# Patient Record
Sex: MALE | Race: WHITE | NOT HISPANIC OR LATINO | URBAN - METROPOLITAN AREA
[De-identification: names, ages, dates, MRNs, and addresses within clinical notes are randomized per-mention and may not be internally consistent; named-entity substitution may affect disease eponyms.]

---
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
## 2019-09-28 ENCOUNTER — EMERGENCY (EMERGENCY)
Facility: HOSPITAL | Age: 19
LOS: 0 days | Discharge: HOME | End: 2019-09-28
Attending: EMERGENCY MEDICINE
Payer: MEDICAID

## 2019-09-28 VITALS
OXYGEN SATURATION: 98 % | RESPIRATION RATE: 16 BRPM | DIASTOLIC BLOOD PRESSURE: 75 MMHG | SYSTOLIC BLOOD PRESSURE: 143 MMHG | TEMPERATURE: 99 F

## 2019-09-28 VITALS — WEIGHT: 179.9 LBS | HEIGHT: 71 IN
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
## 2019-09-28 DIAGNOSIS — S51.812A LACERATION WITHOUT FOREIGN BODY OF LEFT FOREARM, INITIAL ENCOUNTER: ICD-10-CM
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
## 2019-09-28 DIAGNOSIS — S59.802A OTHER SPECIFIED INJURIES OF LEFT ELBOW, INITIAL ENCOUNTER: ICD-10-CM

## 2019-09-28 PROCEDURE — 12032 INTMD RPR S/A/T/EXT 2.6-7.5: CPT

## 2019-09-28 PROCEDURE — 99285 EMERGENCY DEPT VISIT HI MDM: CPT | Mod: 25

## 2019-09-28 PROCEDURE — 73090 X-RAY EXAM OF FOREARM: CPT | Mod: 26,LT

## 2019-09-28 PROCEDURE — 73090 X-RAY EXAM OF FOREARM: CPT | Mod: LT

## 2019-09-28 PROCEDURE — 99285 EMERGENCY DEPT VISIT HI MDM: CPT

## 2019-09-28 RX ORDER — CEPHALEXIN 500 MG
500 CAPSULE ORAL ONCE
Refills: 0 | Status: COMPLETED | OUTPATIENT
Start: 2019-09-28 | End: 2019-09-28

## 2019-09-28 RX ORDER — CEPHALEXIN 500 MG
1 CAPSULE ORAL
Qty: 28 | Refills: 0
Start: 2019-09-28 | End: 2019-10-04

## 2019-09-28 RX ADMIN — Medication 500 MILLIGRAM(S): at 20:25

## 2019-09-28 NOTE — CONSULT NOTE ADULT - ASSESSMENT
A/P: 18M with L forearm lacerations    Keflex TID x 7 days  Dressings clean and dry, change in 3 days with gauze and re-wrap with ace  WBAT LUE  Ice, elevate, extremity as needed  Encourage ROM exercises  FU Dr. Vázquez in clinic in 7-10 days, call office for appointment  Discussed with Dr. Vázquez who agrees with plan    Leo Miranda, DO PGY1  Orthopaedic Surgery
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
## 2019-09-28 NOTE — ED STATDOCS - PATIENT PORTAL LINK FT
You can access the FollowMyHealth Patient Portal offered by [facility] by registering at the following website: [email or web address]. By joining [employer]’s FollowMyHealth portal, you will also be able to view your health information using other applications (apps) compatible with our system.
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
## 2019-09-28 NOTE — ED STATDOCS - MUSCULOSKELETAL, MLM
Gross motor/sensation intact LUE. muscles visible through large laceration of forearm. 2+ radial pulse.
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
## 2019-09-29 RX ORDER — CEPHALEXIN 500 MG
1 CAPSULE ORAL
Qty: 28 | Refills: 0
Start: 2019-09-29 | End: 2019-10-05

## 2019-10-01 ENCOUNTER — EMERGENCY (EMERGENCY)
Facility: HOSPITAL | Age: 19
LOS: 0 days | Discharge: HOME | End: 2019-10-01
Attending: EMERGENCY MEDICINE
Payer: MEDICAID

## 2019-10-01 VITALS
DIASTOLIC BLOOD PRESSURE: 73 MMHG | RESPIRATION RATE: 18 BRPM | OXYGEN SATURATION: 100 % | HEART RATE: 60 BPM | TEMPERATURE: 98 F | SYSTOLIC BLOOD PRESSURE: 135 MMHG

## 2019-10-01 VITALS — HEIGHT: 71 IN | WEIGHT: 179.9 LBS

## 2019-10-01 DIAGNOSIS — S51.822D: ICD-10-CM
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
## 2019-10-01 PROCEDURE — 99282 EMERGENCY DEPT VISIT SF MDM: CPT
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
## 2019-10-01 NOTE — ED PROVIDER NOTE - OBJECTIVE STATEMENT
19 y/o male with no pertinent PMHx presents to the ED c/o laceration to left arm from Saturday. Pt came here and had x-ray and received abx and was discharged to f/u with Dr. Vora but pt came here instead. NKDA. No fever
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
## 2019-10-01 NOTE — ED PROVIDER NOTE - NSFOLLOWUPINSTRUCTIONS_ED_ALL_ED_FT
Laceration    WHAT YOU NEED TO KNOW:    A laceration is an injury to the skin and the soft tissue underneath it. Lacerations happen when you are cut or hit by something. They can happen anywhere on the body.     DISCHARGE INSTRUCTIONS:    Return to the emergency department if:     You have heavy bleeding or bleeding that does not stop after 10 minutes of holding firm, direct pressure over the wound.       Your wound opens up.     Contact your healthcare provider if:     You have a fever or chills.       Your laceration is red, warm, or swollen.      You have red streaks on your skin coming from your wound.      You have white or yellow drainage from the wound that smells bad.      You have pain that gets worse, even after treatment.       You have questions or concerns about your condition or care.     Medicines:     Prescription pain medicine may be given. Ask how to take this medicine safely.       Antibiotics help treat or prevent a bacterial infection.       Take your medicine as directed. Contact your healthcare provider if you think your medicine is not helping or if you have side effects. Tell him or her if you are allergic to any medicine. Keep a list of the medicines, vitamins, and herbs you take. Include the amounts, and when and why you take them. Bring the list or the pill bottles to follow-up visits. Carry your medicine list with you in case of an emergency.    Care for your wound as directed:     Do not get your wound wet until your healthcare provider says it is okay. Do not soak your wound in water. Do not go swimming until your healthcare provider says it is okay. Carefully wash the wound with soap and water. Gently pat the area dry or allow it to air dry.       Change your bandages when they get wet, dirty, or after washing. Apply new, clean bandages as directed. Do not apply elastic bandages or tape too tight. Do not put powders or lotions over your incision.       Apply antibiotic ointment as directed. Your healthcare provider may give you antibiotic ointment to put over your wound if you have stitches. If you have strips of tape over your incision, let them dry up and fall off on their own. If they do not fall off within 14 days, gently remove them. If you have glue over your wound, do not remove or pick at it. If your glue comes off, do not replace it with glue that you have at home.       Check your wound every day for signs of infection such as swelling, redness, or pus.     Self-care:     Apply ice on your wound for 15 to 20 minutes every hour or as directed. Use an ice pack, or put crushed ice in a plastic bag. Cover it with a towel. Ice helps prevent tissue damage and decreases swelling and pain.      Use a splint as directed. A splint will decrease movement and stress on your wound. It may help it heal faster. A splint may be used for lacerations over joints or areas of your body that bend. Ask your healthcare provider how to apply and remove a splint.       Decrease scarring of your wound by applying ointments as directed. Do not apply ointments until your healthcare provider says it is okay. You may need to wait until your wound is healed. Ask which ointment to buy and how often to use it. After your wound is healed, use sunscreen over the area when you are out in the sun. You should do this for at least 6 months to 1 year after your injury.     Follow up with your healthcare provider as directed: You may need to follow up in 24 to 48 hours to have your wound checked for infection. You will need to return in 3 to 14 days if you have stitches or staples so they can be removed. Care for your wound as directed to prevent infection and help it heal. Write down your questions so you remember to ask them during your visits.     FOLLOW UP WITH [facility] IN 7-10 DAYS FOR SUTURE REMOVAL. FINISH YOUR ANTIBIOTICS. RETURN TO ER FOR ANY WORSENING SYMPTOMS OR NEW CONCERNS.

## 2019-10-01 NOTE — ED PROVIDER NOTE - PATIENT PORTAL LINK FT
You can access the FollowMyHealth Patient Portal offered by [facility] by registering at the following website: [email or web address]. By joining [employer]’s FollowMyHealth portal, you will also be able to view your health information using other applications (apps) compatible with our system.

## 2019-10-01 NOTE — ED PROVIDER NOTE - NSFOLLOWUPCLINICS_GEN_ALL_ED_FT
[facility], later [facility]  Family Medicine  [address] [place] [address]  Phone: [phone]  Fax:   Follow Up Time: 7-10 Days

## 2019-10-01 NOTE — ED PROVIDER NOTE - PROGRESS NOTE DETAILS
signed Michela German PA-C   Pt was unable to f/u with Dr Jackson as outpt due to insurance. saw Hansa kang social work here, pt will f/u at [facility] for suture removal 10-12 days. return precautions given. continue abx. Wound well appearing, sutures in place, no erythema, purulence, or dehisence, + swelling and yellowish contusion of forearm. Gross motor/sensation intact. return precautions given. Pt feeling well at AL, agrees with DC and plan of care.

## 2019-10-01 NOTE — ED PROVIDER NOTE - ATTENDING CONTRIBUTION TO CARE
I, Jerson Garcia MD, personally saw the patient with ACP. I have personally performed a face to face diagnostic evaluation on this patient. I reviewed the ACP note and agree with the history, exam, and plan of care, except as noted.
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
## 2019-10-01 NOTE — ED PROVIDER NOTE - SKIN, MLM
+mild swelling to left arm with stiches intact, good neuro motor function, no signs of infection, tendons ok

## 2020-03-03 ENCOUNTER — TRANSCRIPTION ENCOUNTER (OUTPATIENT)
Age: 20
End: 2020-03-03

## 2020-06-11 ENCOUNTER — TRANSCRIPTION ENCOUNTER (OUTPATIENT)
Age: 20
End: 2020-06-11

## 2020-06-18 ENCOUNTER — TRANSCRIPTION ENCOUNTER (OUTPATIENT)
Age: 20
End: 2020-06-18

## 2021-05-27 ENCOUNTER — EMERGENCY (EMERGENCY)
Facility: HOSPITAL | Age: 21
LOS: 0 days | Discharge: HOME | End: 2021-05-27
Attending: EMERGENCY MEDICINE
Payer: MEDICAID

## 2021-05-27 VITALS
HEART RATE: 55 BPM | RESPIRATION RATE: 16 BRPM | DIASTOLIC BLOOD PRESSURE: 79 MMHG | HEIGHT: 71 IN | WEIGHT: 160.06 LBS | TEMPERATURE: 98 F | SYSTOLIC BLOOD PRESSURE: 139 MMHG | OXYGEN SATURATION: 98 %

## 2021-05-27 DIAGNOSIS — S69.81XA OTHER SPECIFIED INJURIES OF RIGHT WRIST, HAND AND FINGER(S), INITIAL ENCOUNTER: ICD-10-CM

## 2021-05-27 DIAGNOSIS — S63.064A DISLOCATION OF METACARPAL (BONE), PROXIMAL END OF RIGHT HAND, INITIAL ENCOUNTER: ICD-10-CM
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
## 2021-05-27 DIAGNOSIS — S62.314A DISPLACED FRACTURE OF BASE OF FOURTH METACARPAL BONE, RIGHT HAND, INITIAL ENCOUNTER FOR CLOSED FRACTURE: ICD-10-CM

## 2021-05-27 PROCEDURE — 73110 X-RAY EXAM OF WRIST: CPT | Mod: RT

## 2021-05-27 PROCEDURE — 26605 TREAT METACARPAL FRACTURE: CPT | Mod: RT

## 2021-05-27 PROCEDURE — 73130 X-RAY EXAM OF HAND: CPT | Mod: RT

## 2021-05-27 PROCEDURE — 73130 X-RAY EXAM OF HAND: CPT | Mod: 26,RT,76

## 2021-05-27 PROCEDURE — 99285 EMERGENCY DEPT VISIT HI MDM: CPT | Mod: 25

## 2021-05-27 PROCEDURE — 26670 TREAT HAND DISLOCATION: CPT | Mod: RT

## 2021-05-27 PROCEDURE — 73110 X-RAY EXAM OF WRIST: CPT | Mod: 26,RT

## 2021-05-27 PROCEDURE — 99284 EMERGENCY DEPT VISIT MOD MDM: CPT

## 2021-05-27 PROCEDURE — 73120 X-RAY EXAM OF HAND: CPT | Mod: RT

## 2021-05-27 RX ORDER — IBUPROFEN 200 MG
600 TABLET ORAL ONCE
Refills: 0 | Status: COMPLETED | OUTPATIENT
Start: 2021-05-27 | End: 2021-05-27

## 2021-05-27 RX ADMIN — Medication 600 MILLIGRAM(S): at 18:07

## 2021-05-27 NOTE — ED STATDOCS - PROGRESS NOTE DETAILS
20yr. old male presents to ED with injury to right hand after caught in a door at home. Reports right wrist pain and swelling. Seen and examined by attending intake. Plan: X-ray and pain control. Will F/U with results and re evaluate. Barbara KHAN Dr. Marroquin called for consult. Ortho resident contacted as per request and in the OR for 2 hours and will evaluate patient in ED. Patient aware of wait time. MTangredi NP signed Michela German PA-C Received patient in sign out from ERIN Gardner.  Pt reduced and splinted in ED by ortho resident Franck. Post reduction films reviewed by Dr Marroquin and pt may DC home. Will likely require outpt surgical repair. rx percocet. Pt feeling well at DC, agrees with DC and plan of care.

## 2021-05-27 NOTE — ED STATDOCS - NSFOLLOWUPINSTRUCTIONS_ED_ALL_ED_FT
Hand Fracture    WHAT YOU NEED TO KNOW:    What is a hand fracture? A hand fracture is a break in a bone in your hand.    What are the signs and symptoms of a hand fracture?   •Pain or tenderness      •Swelling, bruising, or a bump      •Problems moving your hand      •Hand shape is not normal      •Knuckle looks sunken in      How is a hand fracture diagnosed and treated? Your healthcare provider will examine your hand and wrist, and ask about your injury. An x-ray may be used to find a fracture or other problem. Treatment may include any of the following:   •A cast or splint on your hand, wrist, and lower arm will prevent movement while your hand heals.      •NSAIDs help decrease swelling and pain or fever. This medicine is available with or without a doctor's order. NSAIDs can cause stomach bleeding or kidney problems in certain people. If you take blood thinner medicine, always ask your healthcare provider if NSAIDs are safe for you. Always read the medicine label and follow directions.      •Acetaminophen decreases pain and fever. It is available without a doctor's order. Ask how much to take and how often to take it. Follow directions. Read the labels of all other medicines you are using to see if they also contain acetaminophen, or ask your doctor or pharmacist. Acetaminophen can cause liver damage if not taken correctly. Do not use more than 4 grams (4,000 milligrams) total of acetaminophen in one day.       •Prescription pain medicine may be given. Ask your healthcare provider how to take this medicine safely. Some prescription pain medicines contain acetaminophen. Do not take other medicines that contain acetaminophen without talking to your healthcare provider. Too much acetaminophen may cause liver damage. Prescription pain medicine may cause constipation. Ask your healthcare provider how to prevent or treat constipation.       •Closed reduction is used to put the bone back into the correct position without surgery.      •Open reduction surgery may be needed to put the bone back into the correct position. Wires, pins, plates, or screws may be used to keep the broken pieces lined up correctly.      How can I manage my symptoms?   •Wear a splint as directed. Do not remove the splint until you follow up with your healthcare provider or hand specialist.      •Apply ice on your hand for 15 to 20 minutes every hour or as directed. Use an ice pack, or put crushed ice in a plastic bag. Cover it with a towel before you apply it to your skin. Ice helps prevent tissue damage and decreases swelling and pain.      •Elevate your hand above the level of your heart as often as you can. This will help decrease swelling and pain. Prop your hand on pillows or blankets to keep it elevated comfortably.             •Go to physical therapy as directed. A physical therapist teaches you exercises to help improve movement and strength and to decrease pain.      When should I seek immediate care?   •You have severe pain that does not get better, even with pain medicine.      •Your hand or forearm is cold, numb, or pale.      •Your cast or splint gets wet, damaged, or comes off.      When should I call my doctor?   •You have new sores around your cast or splint.      •You notice a bad smell coming from under your cast.      •You have questions or concerns about your condition or care.      CARE AGREEMENT:    You have the right to help plan your care. Learn about your health condition and how it may be treated. Discuss treatment options with your healthcare providers to decide what care you want to receive. You always have the right to refuse treatment.        © Copyright [employer] 2021       CALL DR BRENNER'S OFFICE TOMORROW FOR FOLLOW UP. RETURN TO ER FOR ANY WORSENING SYMPTOMS OR NEW CONCERNS.

## 2021-05-27 NOTE — ED STATDOCS - CARE PLAN
Principal Discharge DX:	Fracture of metacarpal base, closed  Secondary Diagnosis:	Dislocation of carpometacarpal joint  Secondary Diagnosis:	Hand pain

## 2021-05-27 NOTE — CONSULT NOTE ADULT - ASSESSMENT
A: 20M with right 4 and 5th MC Fx /dislocation     P:  post reduction x rays  pain control   NWB RUE   maintain splint   informed patient surgical intervention is recommended , he understands and agrees  case discussed with Dr Marroquin who is aware and agrees with plan   FU with Dr Marroquin in office tomorrow to plan for surgical intervention as outpatient  A: 20M with right 4 and 5th MC Fx /dislocation.      P:  Images showing fracture of the base of 4th Metacarpal and dislocation of the 5th CMC joint. poss Disloction of 4th CMC joint as well.   post reduction x rays taken show reduction of dislocation with some subluxation of the joint  This is an unstable injury and will likely need surgical intervention  Intrinsic plus splint applied to R hand.   pain control   NWB RUE   maintain splint   informed patient surgical intervention is recommended , he understands and agrees  case discussed with Dr Marroquin who is aware and agrees with plan   FU with Dr Marroquin in office tomorrow to plan for surgical intervention as outpatient

## 2021-05-27 NOTE — ED STATDOCS - CARE PROVIDER_API CALL
Bruno Marroquin)  Orthopaedic Surgery  [address], [address]  [place] [address]  Phone: [phone]  Fax: [phone]  Follow Up Time:

## 2021-05-27 NOTE — ED ADULT NURSE NOTE - OBJECTIVE STATEMENT
Pt c/p right hand pain and swelling after he got the hand stuck in the door while he was closing it. Pt's right hand appears red and swollen. No deformity noted

## 2021-05-27 NOTE — CONSULT NOTE ADULT - SUBJECTIVE AND OBJECTIVE BOX
21 y/o RHD M presents to ED c/o right hand pain after it got stuck in the back of a door while he was rushing to shut it. c/o pain and swelling to hand and fingers, denies N/T Right hand. X rays in ED of right hand show Fx /dislocation 4th and 5th MC. no other extremity complaints    PE right hand   + swelling   skin intact  + deformity   + ROM fingers with pain   cap refill brisk   SILT   radial pulse 2+     Procedure: under sterile technique , digital block administered to 4th and 5th MC. close reduction attempted, intrinsic plus splint applied. pt tolerated procedure well, remained NVI 19 y/o RHD M presents to ED c/o right hand pain after it got stuck in the back of a door while he was rushing to shut it. c/o pain and swelling to hand and fingers, denies N/T Right hand. X rays in ED of right hand show Fx /dislocation 4th and 5th MC. no other extremity complaints    PE right hand   + swelling   skin intact  + deformity   + ROM fingers with pain   cap refill brisk   SILT   radial pulse 2+     Procedure: under sterile technique , digital block administered to 4th and 5th CMC joint. close reduction attempted, intrinsic plus splint applied. pt tolerated procedure well, remained NVI

## 2021-05-27 NOTE — ED STATDOCS - PATIENT PORTAL LINK FT
You can access the FollowMyHealth Patient Portal offered by [facility] by registering at the following website: [email or web address]. By joining [employer]’s FollowMyHealth portal, you will also be able to view your health information using other applications (apps) compatible with our system.

## 2021-05-27 NOTE — ED ADULT TRIAGE NOTE - CHIEF COMPLAINT QUOTE
pt presents to ED due to complaints of right wrist/hand  injury pt states the oven door closed on his right hand  swelling noted

## 2021-05-27 NOTE — ED STATDOCS - OBJECTIVE STATEMENT
19 y/o male with no significant PMHx presents to the ED s/p right hand injury PTA today. Pt reports he got his right hand caught in a large metal door at home. Pt c/o right hand/wrist pain, swelling. No meds taken PTA. No numbness, no tingling. No other complaints at this time.

## 2021-05-27 NOTE — ED ADULT NURSE NOTE - NSIMPLEMENTINTERV_GEN_ALL_ED
No Implemented All Universal Safety Interventions:  [place] to call system. Call bell, personal items and telephone within reach. Instruct patient to call for assistance. Room bathroom lighting operational. Non-slip footwear when patient is off stretcher. Physically safe environment: no spills, clutter or unnecessary equipment. Stretcher in lowest position, wheels locked, appropriate side rails in place.

## 2021-06-04 ENCOUNTER — NON-APPOINTMENT (OUTPATIENT)
Age: 21
End: 2021-06-04

## 2021-06-04 ENCOUNTER — APPOINTMENT (OUTPATIENT)
Dept: ORTHOPEDIC SURGERY | Facility: CLINIC | Age: 21
End: 2021-06-04
Payer: MEDICAID

## 2021-06-04 VITALS — DIASTOLIC BLOOD PRESSURE: 64 MMHG | HEART RATE: 53 BPM | SYSTOLIC BLOOD PRESSURE: 107 MMHG

## 2021-06-04 DIAGNOSIS — Z78.9 OTHER SPECIFIED HEALTH STATUS: ICD-10-CM

## 2021-06-04 PROCEDURE — 99072 ADDL SUPL MATRL&STAF TM PHE: CPT

## 2021-06-04 PROCEDURE — 99204 OFFICE O/P NEW MOD 45 MIN: CPT | Mod: 57

## 2021-06-04 PROCEDURE — 73130 X-RAY EXAM OF HAND: CPT | Mod: RT

## 2021-06-04 PROCEDURE — 26600 TREAT METACARPAL FRACTURE: CPT | Mod: RT

## 2021-06-04 RX ORDER — OXYCODONE 5 MG/1
5 TABLET ORAL EVERY 6 HOURS
Qty: 28 | Refills: 0 | Status: ACTIVE | COMMUNITY
Start: 2021-06-04 | End: 1900-01-01

## 2021-06-04 RX ORDER — OXYCODONE HYDROCHLORIDE 30 MG/1
TABLET ORAL
Refills: 0 | Status: ACTIVE | COMMUNITY

## 2021-06-04 NOTE — ASSESSMENT
[FreeTextEntry1] : 20-year-old male with a fracture at the base of the fourth metacarpal.  here is minimal displacement and no gross deformity, I recommend conservative treatment with cast immobilization. He will work on range of motion of the digits within the cast, followup in one week for repeat x-rays in the cast.He will remain nonweightbearing with the right upper extremity.

## 2021-06-04 NOTE — PHYSICAL EXAM
[de-identified] : Right hand:\par There is swelling and ecchymosis over the dorsum of the hand and forearm metacarpal.  There is a dorsal prominence over the fourth metacarpal base.  There is no malrotation. There is decreased range of motion secondary to pain. The skin is intact and there is no evidence of infection. [de-identified] : Radiographs taken previously reviewed reveal a displaced fourth metacarpal base fracture.\par 3 x-ray views taken today of the right hand reveal a displaced fourth metacarpal base fracture, with no further displacement

## 2021-06-04 NOTE — HISTORY OF PRESENT ILLNESS
[FreeTextEntry1] : 20-year-old male presents for evaluation of a right hand injury.  He reports the injury occurred on 5/27 when his right hand got stuck in a door.  He was seen in urgent care and found to have had a fourth metacarpal fracture of the right hand.  He was placed into an ulnar gutter splint and recommended for evaluation here today.  He reports his pain is slightly improved since his injury but still continues to have pain and swelling of the hand and digits.  He has tolerated the splint and kept it clean dry and intact.  He denies paresthesias.  He presents here for evaluation and further treatment.  He reports he has been out of work since his injury.

## 2021-06-11 ENCOUNTER — APPOINTMENT (OUTPATIENT)
Dept: ORTHOPEDIC SURGERY | Facility: CLINIC | Age: 21
End: 2021-06-11
Payer: MEDICAID

## 2021-06-11 PROCEDURE — 29125 APPL SHORT ARM SPLINT STATIC: CPT | Mod: 58,RT

## 2021-06-11 PROCEDURE — 99024 POSTOP FOLLOW-UP VISIT: CPT

## 2021-06-11 PROCEDURE — 73130 X-RAY EXAM OF HAND: CPT | Mod: 26,RT

## 2021-06-14 NOTE — HISTORY OF PRESENT ILLNESS
[FreeTextEntry1] : 20-year-old male presents for evaluation of a right hand injury.  He reports the injury occurred on 5/27 when his right hand got stuck in a door.  He was seen in urgent care and found to have had a fourth metacarpal fracture of the right hand.  He was placed into an ulnar gutter splint and recommended for evaluation here today.  He reports his pain is slightly improved since his injury but still continues to have pain and swelling of the hand and digits.  He has tolerated the splint and kept it clean dry and intact.  He denies paresthesias.  He presents here for evaluation and further treatment.  He reports he has been out of work since his injury.\par \par 06/11/2021: Patient presents two weeks s/p right hand crush injury for reevaluation. He was seen on June 4, and recommended closed treatment for his fourth metacarpal fracture. He was placed into a cast, and has kept it clean dry and intact. He presents here for repeat imaging and reevaluation.

## 2021-06-14 NOTE — ASSESSMENT
[FreeTextEntry1] : 20-year-old male with a fracture at the base of the fourth metacarpal.  There is minimal displacement and no gross deformity, I recommend continued conservative treatment with cast immobilization. He will work on range of motion of the digits within the cast, followup in two week for repeat x-rays in the cast.He will remain nonweightbearing with the right upper extremity.

## 2021-06-14 NOTE — PHYSICAL EXAM
[de-identified] : Right hand:\par Cast clean dry and intact, but loose due to resolution of swelling.  Cast removed today. There is a dorsal prominence over the fourth metacarpal base.  There is no malrotation. There is decreased range of motion secondary to pain. The skin is intact and there is no evidence of infection. [de-identified] : Radiographs taken previously reviewed reveal a displaced fourth metacarpal base fracture.\par 3 x-ray views taken today of the right hand reveal a displaced fourth metacarpal base fracture, with no further displacement

## 2021-06-22 ENCOUNTER — APPOINTMENT (OUTPATIENT)
Dept: ORTHOPEDIC SURGERY | Facility: CLINIC | Age: 21
End: 2021-06-22
Payer: MEDICAID

## 2021-06-22 DIAGNOSIS — Z78.9 OTHER SPECIFIED HEALTH STATUS: ICD-10-CM

## 2021-06-22 PROCEDURE — 99024 POSTOP FOLLOW-UP VISIT: CPT

## 2021-06-22 PROCEDURE — 73130 X-RAY EXAM OF HAND: CPT | Mod: RT

## 2021-06-22 NOTE — HISTORY OF PRESENT ILLNESS
[FreeTextEntry1] : 20-year-old male presents for evaluation of a right hand injury.  He reports the injury occurred on 5/27 when his right hand got stuck in a door.  He was seen in urgent care and found to have had a fourth metacarpal fracture of the right hand.  He was placed into an ulnar gutter splint and recommended for evaluation here today.  He reports his pain is slightly improved since his injury but still continues to have pain and swelling of the hand and digits.  He has tolerated the splint and kept it clean dry and intact.  He denies paresthesias.  He presents here for evaluation and further treatment.  He reports he has been out of work since his injury.\par \par 06/11/2021: Patient presents two weeks s/p right hand crush injury for reevaluation. He was seen on June 4, and recommended closed treatment for his fourth metacarpal fracture. He was placed into a cast, and has kept it clean dry and intact. He presents here for repeat imaging and reevaluation. \par \par 06/22/2021: Patient presents now 3.5 weeks s/p closed treatment of a right fourth metacarpal fracture. he reports he had been tied in a cast while but it had gotten wet this week so he removed it himself.  He presents today in a wrist immobilizer. He reports his pain is improving on a daily basis. He denies regular use of medications for pain. He presents for repeat imaging and evaluation.

## 2021-06-22 NOTE — REASON FOR VISIT
QUESTION ABOUT MEDICATION [Follow-Up Visit] : a follow-up visit for [Hand Pain] : hand pain [Hand Injury] : hand injury

## 2021-06-22 NOTE — ASSESSMENT
[FreeTextEntry1] : 20-year-old male with a fracture at the base of the fourth metacarpal, now 3.5 weeks s/p closed treatment. \par There is minimal displacement and no gross deformity, I recommend continued conservative treatment with a wrist immobilizer. He will work on range of motion of the digits within the brace, and will remain nonweightbearing with the right upper extremity. He will remain out of work at this time. He will follow up in one month for reevaluation and repeat imaging.

## 2021-06-22 NOTE — PHYSICAL EXAM
[de-identified] : Right hand:\par Skin clean dry and intact. mild swelling, no ecchymosis, no erythema. No gross deformity. \par There is a dorsal prominence over the fourth metacarpal base. There is no malrotation. ROM is intact, pt able to touch fingertips to palm\par sensation intact distally, cap refill < 2 sec\par  [de-identified] : 3 x-ray views taken today of the right hand reveal a displaced fourth metacarpal base fracture, with no further displacement, interval callus formation.

## 2021-07-23 ENCOUNTER — APPOINTMENT (OUTPATIENT)
Dept: ORTHOPEDIC SURGERY | Facility: CLINIC | Age: 21
End: 2021-07-23
Payer: MEDICAID

## 2021-07-23 PROCEDURE — 99024 POSTOP FOLLOW-UP VISIT: CPT

## 2021-07-23 PROCEDURE — 73130 X-RAY EXAM OF HAND: CPT | Mod: RT

## 2021-07-23 NOTE — HISTORY OF PRESENT ILLNESS
[FreeTextEntry1] : 20-year-old male presents for evaluation of a right hand injury.  He reports the injury occurred on 5/27 when his right hand got stuck in a door.  He was seen in urgent care and found to have had a fourth metacarpal fracture of the right hand.  He was placed into an ulnar gutter splint and recommended for evaluation here today.  He reports his pain is slightly improved since his injury but still continues to have pain and swelling of the hand and digits.  He has tolerated the splint and kept it clean dry and intact.  He denies paresthesias.  He presents here for evaluation and further treatment.  He reports he has been out of work since his injury.\par \par 06/11/2021: Patient presents two weeks s/p right hand crush injury for reevaluation. He was seen on June 4, and recommended closed treatment for his fourth metacarpal fracture. He was placed into a cast, and has kept it clean dry and intact. He presents here for repeat imaging and reevaluation. \par \par 06/22/2021: Patient presents now 3.5 weeks s/p closed treatment of a right fourth metacarpal fracture. he reports he had been tied in a cast while but it had gotten wet this week so he removed it himself.  He presents today in a wrist immobilizer. He reports his pain is improving on a daily basis. He denies regular use of medications for pain. He presents for repeat imaging and evaluation. \par \par 07/23/2021: Patient presents now 8 weeks s/p closed treatment of a right fourth metacarpal fracture. He has improvement in the pain at the fracture site, he does have stiffness of the digits and wrist.

## 2021-07-23 NOTE — PHYSICAL EXAM
[de-identified] : Right hand:\par Skin clean dry and intact. mild swelling, no ecchymosis, no erythema. No gross deformity. \par There is a dorsal prominence over the fourth metacarpal base. There is no malrotation. ROM is intact, pt able to touch fingertips to palm\par wrist ROM limited 2/2 stiffness\par sensation intact distally, cap refill < 2 sec\par  [de-identified] : 3 x-ray views taken today of the right hand reveal a displaced fourth metacarpal base fracture, with no further displacement, interval callus formation.

## 2021-07-23 NOTE — ASSESSMENT
[FreeTextEntry1] : 20-year-old male with a fracture at the base of the fourth metacarpal, now 8 weeks s/p closed treatment. \par I recommend weaning from the wrist immobilizer and a course of hand therapy for range of motion of the digits and rest. Follow up in 4 weeks for repeat x-rays and range of motion check.

## 2021-08-23 ENCOUNTER — APPOINTMENT (OUTPATIENT)
Dept: ORTHOPEDIC SURGERY | Facility: CLINIC | Age: 21
End: 2021-08-23
Payer: MEDICAID

## 2021-08-23 DIAGNOSIS — S62.348A NONDISPLACED FRACTURE OF BASE OF OTHER METACARPAL BONE, INITIAL ENCOUNTER FOR CLOSED FRACTURE: ICD-10-CM

## 2021-08-23 PROCEDURE — 99024 POSTOP FOLLOW-UP VISIT: CPT

## 2021-08-23 PROCEDURE — 73130 X-RAY EXAM OF HAND: CPT | Mod: RT

## 2021-08-23 NOTE — PHYSICAL EXAM
[Normal] : Alert and in no acute distress [de-identified] : Right hand:\par Skin clean dry and intact. no swelling, no ecchymosis, no erythema. No gross deformity. \par no tenderness to palpation of the fracture site.  There is no malrotation. ROM is intact, pt able to touch fingertips to palm\par wrist ROM slightly limited 2/2 stiffness\par sensation intact distally, cap refill < 2 sec\par  [de-identified] : 3 x-ray views taken today of the right hand reveal a displaced fourth metacarpal base fracture, with no further displacement, interval callus formation.

## 2021-08-23 NOTE — ASSESSMENT
[FreeTextEntry1] : 20-year-old male with a fracture at the base of the fourth metacarpal, now 12 weeks s/p closed treatment. \par I recommend weaning from the wrist immobilizer and a course of hand therapy for range of motion of the digits and rest. Follow up in 4 weeks for range of motion check.

## 2021-08-23 NOTE — HISTORY OF PRESENT ILLNESS
[FreeTextEntry1] : 20-year-old male presents for evaluation of a right hand injury.  He reports the injury occurred on 5/27 when his right hand got stuck in a door.  He was seen in urgent care and found to have had a fourth metacarpal fracture of the right hand.  He was placed into an ulnar gutter splint and recommended for evaluation here today.  He reports his pain is slightly improved since his injury but still continues to have pain and swelling of the hand and digits.  He has tolerated the splint and kept it clean dry and intact.  He denies paresthesias.  He presents here for evaluation and further treatment.  He reports he has been out of work since his injury.\par \par 06/11/2021: Patient presents two weeks s/p right hand crush injury for reevaluation. He was seen on June 4, and recommended closed treatment for his fourth metacarpal fracture. He was placed into a cast, and has kept it clean dry and intact. He presents here for repeat imaging and reevaluation. \par \par 06/22/2021: Patient presents now 3.5 weeks s/p closed treatment of a right fourth metacarpal fracture. he reports he had been tied in a cast while but it had gotten wet this week so he removed it himself.  He presents today in a wrist immobilizer. He reports his pain is improving on a daily basis. He denies regular use of medications for pain. He presents for repeat imaging and evaluation. \par \par 07/23/2021: Patient presents now 8 weeks s/p closed treatment of a right fourth metacarpal fracture. He has improvement in the pain at the fracture site, he does have stiffness of the digits and wrist.\par \par 8/23/2021: Patient presents now 12 weeks s/p closed treatment of Right fourth metacarpal fracture. He continues to wear the wrist immobilizer when active, but not with rest. Reports stiffness of the digits and wrist has improved. Denies any pain, numbness or paresthesia.

## 2021-09-24 ENCOUNTER — APPOINTMENT (OUTPATIENT)
Dept: ORTHOPEDIC SURGERY | Facility: CLINIC | Age: 21
End: 2021-09-24

## 2021-10-08 ENCOUNTER — TRANSCRIPTION ENCOUNTER (OUTPATIENT)
Age: 21
End: 2021-10-08
